# Patient Record
Sex: FEMALE | Race: WHITE | NOT HISPANIC OR LATINO | URBAN - METROPOLITAN AREA
[De-identification: names, ages, dates, MRNs, and addresses within clinical notes are randomized per-mention and may not be internally consistent; named-entity substitution may affect disease eponyms.]

---

## 2017-01-25 ENCOUNTER — IMPORTED ENCOUNTER (OUTPATIENT)
Dept: URBAN - METROPOLITAN AREA CLINIC 50 | Facility: CLINIC | Age: 67
End: 2017-01-25

## 2017-03-28 ENCOUNTER — IMPORTED ENCOUNTER (OUTPATIENT)
Dept: URBAN - METROPOLITAN AREA CLINIC 50 | Facility: CLINIC | Age: 67
End: 2017-03-28

## 2017-04-26 ENCOUNTER — IMPORTED ENCOUNTER (OUTPATIENT)
Dept: URBAN - METROPOLITAN AREA CLINIC 50 | Facility: CLINIC | Age: 67
End: 2017-04-26

## 2017-05-30 ENCOUNTER — IMPORTED ENCOUNTER (OUTPATIENT)
Dept: URBAN - METROPOLITAN AREA CLINIC 50 | Facility: CLINIC | Age: 67
End: 2017-05-30

## 2017-06-13 ENCOUNTER — IMPORTED ENCOUNTER (OUTPATIENT)
Dept: URBAN - METROPOLITAN AREA CLINIC 50 | Facility: CLINIC | Age: 67
End: 2017-06-13

## 2017-08-08 ENCOUNTER — IMPORTED ENCOUNTER (OUTPATIENT)
Dept: URBAN - METROPOLITAN AREA CLINIC 50 | Facility: CLINIC | Age: 67
End: 2017-08-08

## 2017-08-18 ENCOUNTER — IMPORTED ENCOUNTER (OUTPATIENT)
Dept: URBAN - METROPOLITAN AREA CLINIC 50 | Facility: CLINIC | Age: 67
End: 2017-08-18

## 2017-08-23 ENCOUNTER — IMPORTED ENCOUNTER (OUTPATIENT)
Dept: URBAN - METROPOLITAN AREA CLINIC 50 | Facility: CLINIC | Age: 67
End: 2017-08-23

## 2017-08-31 ENCOUNTER — IMPORTED ENCOUNTER (OUTPATIENT)
Dept: URBAN - METROPOLITAN AREA CLINIC 50 | Facility: CLINIC | Age: 67
End: 2017-08-31

## 2017-10-04 ENCOUNTER — IMPORTED ENCOUNTER (OUTPATIENT)
Dept: URBAN - METROPOLITAN AREA CLINIC 50 | Facility: CLINIC | Age: 67
End: 2017-10-04

## 2018-01-05 ENCOUNTER — IMPORTED ENCOUNTER (OUTPATIENT)
Dept: URBAN - METROPOLITAN AREA CLINIC 50 | Facility: CLINIC | Age: 68
End: 2018-01-05

## 2018-01-10 ENCOUNTER — IMPORTED ENCOUNTER (OUTPATIENT)
Dept: URBAN - METROPOLITAN AREA CLINIC 50 | Facility: CLINIC | Age: 68
End: 2018-01-10

## 2018-08-14 ENCOUNTER — IMPORTED ENCOUNTER (OUTPATIENT)
Dept: URBAN - METROPOLITAN AREA CLINIC 50 | Facility: CLINIC | Age: 68
End: 2018-08-14

## 2018-08-14 NOTE — PATIENT DISCUSSION
"""Continue soothe both eyes two - four times a day. "" ""Continue Annella Dom both eyes twice a day. "" ""Continue HydroEye PO twice a day. "" ""Start Warm compresses both eyes two - four times a day.  MASK"""

## 2018-08-16 ENCOUNTER — IMPORTED ENCOUNTER (OUTPATIENT)
Dept: URBAN - METROPOLITAN AREA CLINIC 50 | Facility: CLINIC | Age: 68
End: 2018-08-16

## 2018-08-23 ENCOUNTER — IMPORTED ENCOUNTER (OUTPATIENT)
Dept: URBAN - METROPOLITAN AREA CLINIC 50 | Facility: CLINIC | Age: 68
End: 2018-08-23

## 2019-08-22 ENCOUNTER — IMPORTED ENCOUNTER (OUTPATIENT)
Dept: URBAN - METROPOLITAN AREA CLINIC 50 | Facility: CLINIC | Age: 69
End: 2019-08-22

## 2019-08-22 NOTE — PATIENT DISCUSSION
"""Continue Leandro Sprinkles both eyes twice a day ."" ""Continue HydroEye by mouth twice a day ."" ""Continue Warm compresses both eyes once a day

## 2020-08-19 ENCOUNTER — IMPORTED ENCOUNTER (OUTPATIENT)
Dept: URBAN - METROPOLITAN AREA CLINIC 50 | Facility: CLINIC | Age: 70
End: 2020-08-19

## 2020-09-15 ENCOUNTER — IMPORTED ENCOUNTER (OUTPATIENT)
Dept: URBAN - METROPOLITAN AREA CLINIC 50 | Facility: CLINIC | Age: 70
End: 2020-09-15

## 2021-05-14 ASSESSMENT — TONOMETRY
OS_IOP_MMHG: 13
OD_IOP_MMHG: 10
OS_IOP_MMHG: 12
OS_IOP_MMHG: 12
OS_IOP_MMHG: 11
OS_IOP_MMHG: 11
OS_IOP_MMHG: 12
OS_IOP_MMHG: 10
OD_IOP_MMHG: 12
OD_IOP_MMHG: 11
OD_IOP_MMHG: 10
OD_IOP_MMHG: 11
OS_IOP_MMHG: 12
OD_IOP_MMHG: 11
OD_IOP_MMHG: 12
OS_IOP_MMHG: 12
OS_IOP_MMHG: 11
OD_IOP_MMHG: 12
OS_IOP_MMHG: 12
OS_IOP_MMHG: 13

## 2021-05-14 ASSESSMENT — VISUAL ACUITY
OD_PH: 20/20-1
OD_CC: J1
OD_PH: 20/25+
OS_SC: 20/50-2
OD_SC: 20/40+1
OD_SC: 20/40+2
OS_BAT: 20/50
OD_CC: J1+
OS_OTHER: 20/50. 20/80.
OS_PH: 20/25-
OD_CC: J3
OD_PH: 20/25-1
OS_SC: 20/30+2
OS_OTHER: 20/50. 20/100.
OD_OTHER: 20/40. 20/70.
OS_CC: J1-
OD_SC: 20/40+2
OS_PH: 20/30-
OD_CC: J1
OD_PH: 20/25
OD_CC: J1@ 16 IN
OD_SC: 20/40
OS_CC: J1
OS_SC: 20/30-1
OS_SC: 20/40
OD_PH: 20/25
OS_BAT: 20/50
OD_SC: 20/40
OD_SC: 20/30
OD_SC: 20/50+2
OS_PH: 20/25-2
OS_SC: 20/30
OD_BAT: 20/40
OS_SC: 20/30
OD_PH: 20/20-
OD_SC: 20/40
OS_PH: @ 13 IN
OD_PH: 20/25-2
OD_SC: 20/30-
OD_PH: 20/20-
OS_CC: J1@ 13 IN
OS_SC: 20/50+2
OS_SC: 20/40
OS_SC: 20/30-1
OS_PH: 20/25
OD_CC: J1@ 13 IN
OS_CC: J1+
OS_PH: 20/25
OS_PH: 20/20
OD_SC: 20/30-
OS_SC: 20/30
OS_SC: 20/50+
OD_CC: J1-
OS_PH: 20/30-2
OS_SC: 20/40
OD_PH: @ 13 IN
OS_CC: J3
OS_PH: 20/30-
OD_SC: 20/40+2
OD_SC: 20/50
OD_SC: 20/30
OS_SC: 20/40-
OS_CC: J1
OS_CC: J1@ 16 IN
OD_PH: 20/30-

## 2021-09-17 ENCOUNTER — PREPPED CHART (OUTPATIENT)
Dept: URBAN - METROPOLITAN AREA CLINIC 52 | Facility: CLINIC | Age: 71
End: 2021-09-17

## 2021-09-21 ENCOUNTER — COMPREHENSIVE EXAM (OUTPATIENT)
Dept: URBAN - METROPOLITAN AREA CLINIC 52 | Facility: CLINIC | Age: 71
End: 2021-09-21

## 2021-09-21 DIAGNOSIS — H16.223: ICD-10-CM

## 2021-09-21 DIAGNOSIS — H35.361: ICD-10-CM

## 2021-09-21 DIAGNOSIS — H43.813: ICD-10-CM

## 2021-09-21 DIAGNOSIS — H35.373: ICD-10-CM

## 2021-09-21 PROCEDURE — 92014 COMPRE OPH EXAM EST PT 1/>: CPT

## 2021-09-21 PROCEDURE — 92134 CPTRZ OPH DX IMG PST SGM RTA: CPT

## 2021-09-21 ASSESSMENT — TONOMETRY
OS_IOP_MMHG: 13
OD_IOP_MMHG: 11

## 2021-09-21 ASSESSMENT — VISUAL ACUITY
OD_SC: 20/40
OS_SC: 20/25-2
OD_PH: 20/25-1
OU_SC: J1+

## 2022-10-20 ENCOUNTER — COMPREHENSIVE EXAM (OUTPATIENT)
Dept: URBAN - METROPOLITAN AREA CLINIC 49 | Facility: CLINIC | Age: 72
End: 2022-10-20

## 2022-10-20 DIAGNOSIS — H35.361: ICD-10-CM

## 2022-10-20 DIAGNOSIS — H35.373: ICD-10-CM

## 2022-10-20 DIAGNOSIS — H35.3110: ICD-10-CM

## 2022-10-20 DIAGNOSIS — H04.123: ICD-10-CM

## 2022-10-20 PROCEDURE — 92134 CPTRZ OPH DX IMG PST SGM RTA: CPT

## 2022-10-20 PROCEDURE — 92014 COMPRE OPH EXAM EST PT 1/>: CPT

## 2022-10-20 ASSESSMENT — VISUAL ACUITY
OD_SC: 20/25-1
OU_SC: J1
OS_SC: 20/25-2

## 2022-10-20 ASSESSMENT — TONOMETRY
OD_IOP_MMHG: 11
OS_IOP_MMHG: 12

## 2022-10-20 NOTE — PATIENT DISCUSSION
Advised patient to use PF artificial tears on a regular basis. Explained to her the difference between Restasis and Elian Rush. Patient perfers to stay on Restasis.

## 2022-10-20 NOTE — PATIENT DISCUSSION
Advised regular use of Amsler grid. Gave patient the directions on how to use. Advised if there are any changes she needs to call.

## 2022-10-20 NOTE — PATIENT DISCUSSION
Explained to the patient macular degeneration, and advised her it would be monitored yearly. Discussed AREDS supplements, BP Control, and dark leafy green vegetables.

## 2023-04-20 ENCOUNTER — ESTABLISHED PATIENT (OUTPATIENT)
Dept: URBAN - METROPOLITAN AREA CLINIC 49 | Facility: CLINIC | Age: 73
End: 2023-04-20

## 2023-04-20 DIAGNOSIS — H35.721: ICD-10-CM

## 2023-04-20 DIAGNOSIS — H35.362: ICD-10-CM

## 2023-04-20 DIAGNOSIS — H35.373: ICD-10-CM

## 2023-04-20 DIAGNOSIS — H04.123: ICD-10-CM

## 2023-04-20 DIAGNOSIS — H16.223: ICD-10-CM

## 2023-04-20 DIAGNOSIS — H43.813: ICD-10-CM

## 2023-04-20 DIAGNOSIS — H35.3110: ICD-10-CM

## 2023-04-20 PROCEDURE — 92014 COMPRE OPH EXAM EST PT 1/>: CPT

## 2023-04-20 PROCEDURE — 92134 CPTRZ OPH DX IMG PST SGM RTA: CPT

## 2023-04-20 ASSESSMENT — VISUAL ACUITY
OS_SC: 20/30
OD_SC: 20/25-1

## 2023-04-20 ASSESSMENT — TONOMETRY
OS_IOP_MMHG: 12
OD_IOP_MMHG: 12

## 2023-10-25 ENCOUNTER — ESTABLISHED PATIENT (OUTPATIENT)
Dept: URBAN - METROPOLITAN AREA CLINIC 49 | Facility: LOCATION | Age: 73
End: 2023-10-25

## 2023-10-25 DIAGNOSIS — H43.813: ICD-10-CM

## 2023-10-25 DIAGNOSIS — H35.3110: ICD-10-CM

## 2023-10-25 DIAGNOSIS — H35.373: ICD-10-CM

## 2023-10-25 DIAGNOSIS — H35.721: ICD-10-CM

## 2023-10-25 DIAGNOSIS — H04.123: ICD-10-CM

## 2023-10-25 PROCEDURE — 99214 OFFICE O/P EST MOD 30 MIN: CPT

## 2023-10-25 PROCEDURE — 92134 CPTRZ OPH DX IMG PST SGM RTA: CPT

## 2023-10-25 ASSESSMENT — VISUAL ACUITY
OD_PH: 20/25
OD_SC: 20/30+1
OS_PH: 20/25
OS_SC: 20/30+2

## 2023-10-25 ASSESSMENT — TONOMETRY
OD_IOP_MMHG: 13
OS_IOP_MMHG: 12

## 2024-10-30 ENCOUNTER — COMPREHENSIVE EXAM (OUTPATIENT)
Dept: URBAN - METROPOLITAN AREA CLINIC 49 | Facility: LOCATION | Age: 74
End: 2024-10-30

## 2024-10-30 DIAGNOSIS — H35.721: ICD-10-CM

## 2024-10-30 DIAGNOSIS — H35.3131: ICD-10-CM

## 2024-10-30 DIAGNOSIS — H04.123: ICD-10-CM

## 2024-10-30 DIAGNOSIS — H43.813: ICD-10-CM

## 2024-10-30 DIAGNOSIS — H35.373: ICD-10-CM

## 2024-10-30 PROCEDURE — 92134 CPTRZ OPH DX IMG PST SGM RTA: CPT

## 2024-10-30 PROCEDURE — 99214 OFFICE O/P EST MOD 30 MIN: CPT
